# Patient Record
Sex: MALE | Race: ASIAN | NOT HISPANIC OR LATINO | URBAN - METROPOLITAN AREA
[De-identification: names, ages, dates, MRNs, and addresses within clinical notes are randomized per-mention and may not be internally consistent; named-entity substitution may affect disease eponyms.]

---

## 2021-05-14 ENCOUNTER — OFFICE VISIT (OUTPATIENT)
Dept: URBAN - METROPOLITAN AREA CLINIC 37 | Facility: CLINIC | Age: 56
End: 2021-05-14

## 2021-05-14 VITALS — HEIGHT: 68 IN | WEIGHT: 186 LBS | BODY MASS INDEX: 28.19 KG/M2

## 2021-05-14 PROBLEM — 267434003: Status: ACTIVE | Noted: 2021-05-14

## 2021-05-14 PROBLEM — 14760008: Status: ACTIVE | Noted: 2021-05-14

## 2021-05-14 PROBLEM — 275978004 SCREENING FOR MALIGNANT NEOPLASM OF COLON: Status: ACTIVE | Noted: 2021-05-14

## 2021-05-14 RX ORDER — OMEPRAZOLE 40 MG/1
1 CAPSULE CAPSULE, DELAYED RELEASE ORAL
Status: ACTIVE | COMMUNITY

## 2021-05-14 RX ORDER — ACETAMINOPHEN, ASPIRIN (NSAID) AND CAFFEINE 250; 250; 65 MG/1; MG/1; MG/1
2 TABLETS TABLET, FILM COATED ORAL PRN
Status: ACTIVE | COMMUNITY

## 2021-05-14 RX ORDER — PSYLLIUM SEED (WITH DEXTROSE)
AS DIRECTED POWDER (GRAM) ORAL
Status: ACTIVE | COMMUNITY

## 2021-05-14 RX ORDER — BISACODYL 5 MG
2 TABLET AS NEEDED TABLET, DELAYED RELEASE (ENTERIC COATED) ORAL PRN
Status: ACTIVE | COMMUNITY

## 2021-05-14 RX ORDER — SODIUM SULFATE, POTASSIUM SULFATE, MAGNESIUM SULFATE 17.5; 3.13; 1.6 G/ML; G/ML; G/ML
AS DIRECTED SOLUTION, CONCENTRATE ORAL ONCE
Qty: 1 KIT | Refills: 0 | OUTPATIENT
Start: 2021-05-14

## 2021-05-14 RX ORDER — POLYETHYLENE GLYCOL 3350 17 G/17G
17 GM MIXED IN 10 OZ OF FLUID POWDER, FOR SOLUTION ORAL BID
Qty: 1 BOTTLE | Refills: 2 | OUTPATIENT
Start: 2021-05-14

## 2021-05-14 RX ORDER — DOCUSATE SODIUM 100 MG/1
2 CAPSULES CAPSULE ORAL ONCE A DAY
Qty: 60 CAPSULE | Refills: 2 | OUTPATIENT
Start: 2021-05-14

## 2021-05-14 NOTE — HPI-MIGRATED HPI
Colorectal cancer screening : Current bowel movement patterns -> One BM every 4- 5 days, but no blood. He has tried Ducolax after day 3rd or 4th without BM. He also tried Metamucil 3 tablets daily ;   Colorectal cancer screening : Family history of GI malignancy: -> Denies;   Colorectal cancer screening : Patient is here for initial consultation for -> first time screening colonoscopy;   Colorectal cancer screening : Patients denies -> rectal bleeding, change in bowel habits, constipation, diarrhea, or abdominal pain;     Colorectal cancer screening : Denies dysphagia or odynophagia Currently taking anticoagulants/NSAIDs: No;

## 2021-05-16 ENCOUNTER — LAB OUTSIDE AN ENCOUNTER (OUTPATIENT)
Dept: URBAN - METROPOLITAN AREA CLINIC 37 | Facility: CLINIC | Age: 56
End: 2021-05-16

## 2021-05-21 ENCOUNTER — OFFICE VISIT (OUTPATIENT)
Dept: URBAN - METROPOLITAN AREA SURGERY CENTER 8 | Facility: SURGERY CENTER | Age: 56
End: 2021-05-21

## 2021-06-03 ENCOUNTER — DASHBOARD ENCOUNTERS (OUTPATIENT)
Age: 56
End: 2021-06-03

## 2021-06-04 ENCOUNTER — OFFICE VISIT (OUTPATIENT)
Dept: URBAN - METROPOLITAN AREA CLINIC 37 | Facility: CLINIC | Age: 56
End: 2021-06-04

## 2021-06-04 VITALS — WEIGHT: 180 LBS | HEIGHT: 68 IN | BODY MASS INDEX: 27.28 KG/M2

## 2021-06-04 RX ORDER — PSYLLIUM SEED (WITH DEXTROSE)
AS DIRECTED POWDER (GRAM) ORAL
Status: ON HOLD | COMMUNITY

## 2021-06-04 RX ORDER — POLYETHYLENE GLYCOL 3350 17 G/17G
17 GM MIXED IN 10 OZ OF FLUID POWDER, FOR SOLUTION ORAL BID
Qty: 1 BOTTLE | Refills: 2 | Status: ON HOLD | COMMUNITY
Start: 2021-05-14

## 2021-06-04 RX ORDER — BISACODYL 5 MG
2 TABLET AS NEEDED TABLET, DELAYED RELEASE (ENTERIC COATED) ORAL PRN
Status: ON HOLD | COMMUNITY

## 2021-06-04 RX ORDER — OMEPRAZOLE 40 MG/1
1 CAPSULE CAPSULE, DELAYED RELEASE ORAL
Status: ACTIVE | COMMUNITY

## 2021-06-04 RX ORDER — DOCUSATE SODIUM 100 MG/1
2 CAPSULES CAPSULE ORAL ONCE A DAY
Qty: 60 CAPSULE | Refills: 2 | Status: ACTIVE | COMMUNITY
Start: 2021-05-14

## 2021-06-04 RX ORDER — SODIUM SULFATE, POTASSIUM SULFATE, MAGNESIUM SULFATE 17.5; 3.13; 1.6 G/ML; G/ML; G/ML
AS DIRECTED SOLUTION, CONCENTRATE ORAL ONCE
Qty: 1 KIT | Refills: 0 | Status: DISCONTINUED | COMMUNITY
Start: 2021-05-14